# Patient Record
Sex: MALE | Race: WHITE | NOT HISPANIC OR LATINO | URBAN - METROPOLITAN AREA
[De-identification: names, ages, dates, MRNs, and addresses within clinical notes are randomized per-mention and may not be internally consistent; named-entity substitution may affect disease eponyms.]

---

## 2024-07-22 ENCOUNTER — EMERGENCY (EMERGENCY)
Facility: HOSPITAL | Age: 34
LOS: 0 days | Discharge: HOME | End: 2024-07-23
Attending: EMERGENCY MEDICINE
Payer: COMMERCIAL

## 2024-07-22 VITALS
RESPIRATION RATE: 18 BRPM | SYSTOLIC BLOOD PRESSURE: 128 MMHG | TEMPERATURE: 98 F | WEIGHT: 167.99 LBS | DIASTOLIC BLOOD PRESSURE: 76 MMHG | HEART RATE: 87 BPM | OXYGEN SATURATION: 97 %
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
## 2024-07-22 DIAGNOSIS — H10.029 OTHER MUCOPURULENT CONJUNCTIVITIS, UNSPECIFIED EYE: ICD-10-CM

## 2024-07-22 DIAGNOSIS — T14.8XXA OTHER INJURY OF UNSPECIFIED BODY REGION, INITIAL ENCOUNTER: ICD-10-CM
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
## 2024-07-22 PROCEDURE — 99284 EMERGENCY DEPT VISIT MOD MDM: CPT

## 2024-07-22 PROCEDURE — 99283 EMERGENCY DEPT VISIT LOW MDM: CPT

## 2024-07-22 RX ORDER — DOXYCYCLINE 100 MG/1
100 CAPSULE ORAL ONCE
Refills: 0 | Status: COMPLETED | OUTPATIENT
Start: 2024-07-22 | End: 2024-07-22

## 2024-07-22 RX ORDER — DOXYCYCLINE 100 MG/1
1 CAPSULE ORAL
Qty: 20 | Refills: 0
Start: 2024-07-22 | End: 2024-07-31

## 2024-07-22 RX ADMIN — DOXYCYCLINE 100 MILLIGRAM(S): 100 CAPSULE ORAL at 23:51

## 2024-07-22 NOTE — ED PROVIDER NOTE - OBJECTIVE STATEMENT
34-year-old patient presents for evaluation, has multiple mosquito bites however one of them has a ring around it and he is concerned for possible tick bite,

## 2024-07-22 NOTE — ED PROVIDER NOTE - PATIENT PORTAL LINK FT
You can access the FollowMyHealth Patient Portal offered by [facility] by registering at the following website: [email or web address]. By joining [facility]’s FollowMyHealth portal, you will also be able to view your health information using other applications (apps) compatible with our system.

## 2024-07-22 NOTE — ED PROVIDER NOTE - NSFOLLOWUPINSTRUCTIONS_ED_ALL_ED_FT
Follow up with your primary care doctor and infectious disease in 1-2 days     Acute Rash    WHAT YOU NEED TO KNOW:    A rash is irritation, redness, or itchiness in the skin or mucus membranes. Mucus membranes are areas such as the lining of your nose or throat. Acute means the rash starts suddenly, worsens quickly, and lasts a short time. An acute rash may be caused by a disease, such as hepatitis or vasculitis. The rash may be a reaction to something you are allergic to, such as certain foods, or latex. Certain medicines, including antibiotics, NSAIDs, prescription pain medicine, and aspirin can also cause a rash.     DISCHARGE INSTRUCTIONS:    Return to the emergency department if:     You have sudden trouble breathing or chest pain.      You are vomiting, have a headache or muscle aches, and your throat hurts.    Contact your healthcare provider if:     You have a fever.      You get open wounds from scratching your skin, or you have a wound that is red, swollen, or painful.       Your rash lasts longer than 3 months.      You have swelling or pain in your joints.       You have questions or concerns about your condition or care.    Medicines: If your rash does not go away on its own, you may need the following medicines:     Antihistamines may be given to help decrease itching.       Steroids may be given to decrease inflammation.      Antibiotics help fight or prevent a bacterial infection.       Take your medicine as directed. Contact your healthcare provider if you think your medicine is not helping or if you have side effects. Tell him of her if you are allergic to any medicine. Keep a list of the medicines, vitamins, and herbs you take. Include the amounts, and when and why you take them. Bring the list or the pill bottles to follow-up visits. Carry your medicine list with you in case of an emergency.    Prevent a rash or care for your skin when you have a rash: Dry skin can lead to more problems. Do not scratch your skin if it itches. You may cause a skin infection by scratching. The following may prevent dry skin, and help your skin look better:     Use thick cream lotions or petroleum jelly to help soothe your rash. These products work well on areas with thick skin, such as your feet. Cool compresses may also be used to soothe your skin. Apply a cool compress or a cool, wet towel, and then cover it with a dry towel.       Use lukewarm water when you bathe. Hot water may damage your skin more. Pat your skin dry. Do not rub your skin with a towel.       Use detergents, soaps, shampoos, and bubble baths made for sensitive skin. Wear clothes that are made of cotton instead of nylon or wool. Cotton is softer, so it will not hurt your skin as much.    Follow up with your healthcare provider as directed: You may need to see a dermatologist if healthcare providers do not know what is causing your rash. You may also need to see a dermatologist if your rash does not get better even with treatment. You may need to see a dietitian if you have allergies to foods. Write down your questions so you remember to ask them during your visits.       © Copyright [employer] 2019 All illustrations and images included in CareNotes are the copyrighted property of [employer] or [employer].        Insect Bite, Adult    An insect bite can make your skin red, itchy, and swollen. An insect bite is different from an insect sting, which happens when an insect injects poison (venom) into the skin.    Some insects can spread disease to people through a bite. However, most insect bites do not lead to disease and are not serious.    What are the causes?  Insects may bite for a variety of reasons, including:    Hunger.  To defend themselves.    Insects that bite include:    Spiders.  Mosquitoes.  Ticks.  Fleas.  Ants.  Flies.  Bedbugs.    What are the signs or symptoms?  Symptoms of this condition include:    Itching or pain in the bite area.  Redness and swelling in the bite area.  An open wound (skin ulcer).    In many cases, symptoms last for 2–4 days.    How is this diagnosed?  This condition is usually diagnosed based on symptoms and a physical exam.    How is this treated?  Treatment is usually not needed. Symptoms often go away on their own. When treatment is recommended, it may involve:    Applying a cream or lotion to the bitten area. This treatment helps with itching.  Taking an antibiotic medicine. This treatment is needed if the bite area gets infected.  Getting a tetanus shot.  Applying ice to the affected area.  Medicines called antihistamines. This treatment is needed if you develop an allergic reaction to the insect bite.    Follow these instructions at home:  Bite area care     Do not scratch the bite area.  Keep the bite area clean and dry. Wash it every day with soap and water as told by your health care provider.  Check the bite area every day for signs of infection. Check for:    More redness, swelling, or pain.  Fluid or blood.  Warmth.  Pus.    Managing pain, itching, and swelling       You may apply a baking soda paste, cortisone cream, or calamine lotion to the bite area as told by your health care provider.  If directed, apply ice to the bite area.    Put ice in a plastic bag.  Place a towel between your skin and the bag.  Leave the ice on for 20 minutes, 2–3 times per day.    Medicines     Apply or take over-the-counter and prescription medicines only as told by your health care provider.  If you were prescribed an antibiotic medicine, use it as told by your health care provider. Do not stop using the antibiotic even if your condition improves.  General instructions     Keep all follow-up visits as told by your health care provider. This is important.  How is this prevented?  To help reduce your risk of insect bites:    When you are outdoors, wear clothing that covers your arms and legs.  Use insect repellent. The best insect repellents contain:    DEET, picaridin, oil of lemon eucalyptus (OLE), or [id number].  Higher amounts of an active ingredient.    If your home windows do not have screens, consider installing them.    Contact a health care provider if:  You have more redness, swelling, or pain in the bite area.  You have fluid, blood, or pus coming from the bite area.  The bite area feels warm to the touch.  You have a fever.  Get help right away if:  You have joint pain.  You have a rash.  You have shortness of breath.  You feel unusually tired or sleepy.  You have neck pain.  You have a headache.  You have unusual weakness.  You have chest pain.  You have nausea, vomiting, or pain in the abdomen.

## 2024-07-22 NOTE — ED PROVIDER NOTE - ATTENDING APP SHARED VISIT CONTRIBUTION OF CARE
34-year-old patient presents for evaluation, has multiple mosquito bites however one of them has a ring around it and he is concerned for possible tick bite, no constitutional symptoms, exam as above, will discharge on doxycycline with outpatient follow-up.  Patient counseled regarding additions which should prompt return.

## 2024-07-22 NOTE — ED PROVIDER NOTE - PHYSICAL EXAMINATION
Physical Exam    Vital Signs: I have reviewed the initial vital signs.  Constitutional: well-nourished, appears stated age, no acute distress  Eyes: Conjunctiva pink, Sclera clear, PERRLA, EOMI.  Cardiovascular: S1 and S2, regular rate, regular rhythm, well-perfused extremities, radial pulses equal and 2+  Respiratory: unlabored respiratory effort, clear to auscultation bilaterally no wheezing, rales and rhonchi  Gastrointestinal: soft, non-tender abdomen, no pulsatile mass, normal bowl sounds  Musculoskeletal: supple neck, no lower extremity edema, no midline tenderness  Integumentary: warm, dry,+multiple bug bites noted with one with target like area around it  Neurologic: awake, alert, cranial nerves II-XII grossly intact, extremities’ motor and sensory functions grossly intact  Psychiatric: appropriate mood, appropriate affect

## 2024-07-23 NOTE — ED ADULT NURSE NOTE - NSFALLUNIVINTERV_ED_ALL_ED
Bed/Stretcher in lowest position, wheels locked, appropriate side rails in place/Call bell, personal items and telephone in reach/Instruct patient to call for assistance before getting out of bed/chair/stretcher/Non-slip footwear applied when patient is off stretcher/[place] to call system/Physically safe environment - no spills, clutter or unnecessary equipment/Purposeful proactive rounding/Room/bathroom lighting operational, light cord in reach
